# Patient Record
Sex: FEMALE | Race: WHITE | NOT HISPANIC OR LATINO | URBAN - METROPOLITAN AREA
[De-identification: names, ages, dates, MRNs, and addresses within clinical notes are randomized per-mention and may not be internally consistent; named-entity substitution may affect disease eponyms.]

---

## 2017-01-10 ENCOUNTER — EMERGENCY (EMERGENCY)
Facility: HOSPITAL | Age: 82
LOS: 1 days | Discharge: HOME | End: 2017-01-10
Attending: EMERGENCY MEDICINE | Admitting: EMERGENCY MEDICINE
Payer: MEDICARE

## 2017-01-10 VITALS
HEART RATE: 60 BPM | TEMPERATURE: 98 F | SYSTOLIC BLOOD PRESSURE: 170 MMHG | RESPIRATION RATE: 11 BRPM | OXYGEN SATURATION: 96 % | DIASTOLIC BLOOD PRESSURE: 65 MMHG | WEIGHT: 160.06 LBS

## 2017-01-10 PROCEDURE — 99285 EMERGENCY DEPT VISIT HI MDM: CPT | Mod: 25

## 2017-01-10 NOTE — ED ADULT NURSE NOTE - OBJECTIVE STATEMENT
awale no distress denies any pain at present  pt with ekg done and reviewed by MD and placed on the monitor awiting labs and  xray

## 2017-01-10 NOTE — ED ADULT NURSE NOTE - PMH
Chronic Pancreatitis    CKD (Chronic Kidney Disease)    COPD (Chronic Obstructive Pulmonary Disease)    DM (Diabetes Mellitus)    Gall Stones    Hypertension, Essential    Peptic ulcer disease

## 2017-01-11 VITALS
SYSTOLIC BLOOD PRESSURE: 170 MMHG | RESPIRATION RATE: 14 BRPM | HEART RATE: 64 BPM | TEMPERATURE: 98 F | DIASTOLIC BLOOD PRESSURE: 71 MMHG

## 2017-01-11 LAB
ALBUMIN SERPL ELPH-MCNC: 2.7 G/DL — LOW (ref 3.3–5)
ALP SERPL-CCNC: 103 U/L — SIGNIFICANT CHANGE UP (ref 40–120)
ALT FLD-CCNC: 18 U/L — SIGNIFICANT CHANGE UP (ref 12–78)
ANION GAP SERPL CALC-SCNC: 11 MMOL/L — SIGNIFICANT CHANGE UP (ref 5–17)
AST SERPL-CCNC: 20 U/L — SIGNIFICANT CHANGE UP (ref 15–37)
BASOPHILS # BLD AUTO: 0.1 K/UL — SIGNIFICANT CHANGE UP (ref 0–0.2)
BASOPHILS NFR BLD AUTO: 0.9 % — SIGNIFICANT CHANGE UP (ref 0–2)
BILIRUB SERPL-MCNC: 0.2 MG/DL — SIGNIFICANT CHANGE UP (ref 0.2–1.2)
BUN SERPL-MCNC: 59 MG/DL — HIGH (ref 7–23)
CALCIUM SERPL-MCNC: 8.5 MG/DL — SIGNIFICANT CHANGE UP (ref 8.5–10.1)
CHLORIDE SERPL-SCNC: 106 MMOL/L — SIGNIFICANT CHANGE UP (ref 96–108)
CK MB BLD-MCNC: 6.5 % — HIGH (ref 0–3.5)
CK MB CFR SERPL CALC: 3.1 NG/ML — SIGNIFICANT CHANGE UP (ref 0–3.6)
CK SERPL-CCNC: 48 U/L — SIGNIFICANT CHANGE UP (ref 26–192)
CO2 SERPL-SCNC: 23 MMOL/L — SIGNIFICANT CHANGE UP (ref 22–31)
CREAT SERPL-MCNC: 3.3 MG/DL — HIGH (ref 0.5–1.3)
EOSINOPHIL # BLD AUTO: 0.3 K/UL — SIGNIFICANT CHANGE UP (ref 0–0.5)
EOSINOPHIL NFR BLD AUTO: 3.7 % — SIGNIFICANT CHANGE UP (ref 0–6)
GLUCOSE SERPL-MCNC: 165 MG/DL — HIGH (ref 70–99)
HCT VFR BLD CALC: 32.7 % — LOW (ref 34.5–45)
HGB BLD-MCNC: 10.5 G/DL — LOW (ref 11.5–15.5)
LYMPHOCYTES # BLD AUTO: 1.5 K/UL — SIGNIFICANT CHANGE UP (ref 1–3.3)
LYMPHOCYTES # BLD AUTO: 21.9 % — SIGNIFICANT CHANGE UP (ref 13–44)
MCHC RBC-ENTMCNC: 27.9 PG — SIGNIFICANT CHANGE UP (ref 27–34)
MCHC RBC-ENTMCNC: 32.1 GM/DL — SIGNIFICANT CHANGE UP (ref 32–36)
MCV RBC AUTO: 86.8 FL — SIGNIFICANT CHANGE UP (ref 80–100)
MONOCYTES # BLD AUTO: 0.7 K/UL — SIGNIFICANT CHANGE UP (ref 0–0.9)
MONOCYTES NFR BLD AUTO: 9.5 % — HIGH (ref 1–9)
NEUTROPHILS # BLD AUTO: 4.5 K/UL — SIGNIFICANT CHANGE UP (ref 1.8–7.4)
NEUTROPHILS NFR BLD AUTO: 64.1 % — SIGNIFICANT CHANGE UP (ref 43–77)
PLATELET # BLD AUTO: 184 K/UL — SIGNIFICANT CHANGE UP (ref 150–400)
POTASSIUM SERPL-MCNC: 4.4 MMOL/L — SIGNIFICANT CHANGE UP (ref 3.5–5.3)
POTASSIUM SERPL-SCNC: 4.4 MMOL/L — SIGNIFICANT CHANGE UP (ref 3.5–5.3)
PROT SERPL-MCNC: 6.7 G/DL — SIGNIFICANT CHANGE UP (ref 6–8.3)
RBC # BLD: 3.77 M/UL — LOW (ref 3.8–5.2)
RBC # FLD: 14.6 % — HIGH (ref 10.3–14.5)
SODIUM SERPL-SCNC: 140 MMOL/L — SIGNIFICANT CHANGE UP (ref 135–145)
TROPONIN I SERPL-MCNC: 0.59 NG/ML — HIGH (ref 0.01–0.04)
TROPONIN I SERPL-MCNC: 0.86 NG/ML — HIGH (ref 0.01–0.04)
WBC # BLD: 7.1 K/UL — SIGNIFICANT CHANGE UP (ref 3.8–10.5)
WBC # FLD AUTO: 7.1 K/UL — SIGNIFICANT CHANGE UP (ref 3.8–10.5)

## 2017-01-11 PROCEDURE — 82550 ASSAY OF CK (CPK): CPT

## 2017-01-11 PROCEDURE — 71010: CPT | Mod: 26

## 2017-01-11 PROCEDURE — 85027 COMPLETE CBC AUTOMATED: CPT

## 2017-01-11 PROCEDURE — 93005 ELECTROCARDIOGRAM TRACING: CPT

## 2017-01-11 PROCEDURE — 96374 THER/PROPH/DIAG INJ IV PUSH: CPT

## 2017-01-11 PROCEDURE — 82553 CREATINE MB FRACTION: CPT

## 2017-01-11 PROCEDURE — 71045 X-RAY EXAM CHEST 1 VIEW: CPT

## 2017-01-11 PROCEDURE — 96376 TX/PRO/DX INJ SAME DRUG ADON: CPT

## 2017-01-11 PROCEDURE — 99284 EMERGENCY DEPT VISIT MOD MDM: CPT | Mod: 25

## 2017-01-11 PROCEDURE — 84484 ASSAY OF TROPONIN QUANT: CPT

## 2017-01-11 PROCEDURE — 99285 EMERGENCY DEPT VISIT HI MDM: CPT

## 2017-01-11 PROCEDURE — 93010 ELECTROCARDIOGRAM REPORT: CPT

## 2017-01-11 PROCEDURE — 80053 COMPREHEN METABOLIC PANEL: CPT

## 2017-01-11 RX ORDER — SODIUM CHLORIDE 9 MG/ML
3 INJECTION INTRAMUSCULAR; INTRAVENOUS; SUBCUTANEOUS ONCE
Qty: 0 | Refills: 0 | Status: COMPLETED | OUTPATIENT
Start: 2017-01-11 | End: 2017-01-11

## 2017-01-11 RX ORDER — METOPROLOL TARTRATE 50 MG
5 TABLET ORAL ONCE
Qty: 0 | Refills: 0 | Status: COMPLETED | OUTPATIENT
Start: 2017-01-11 | End: 2017-01-11

## 2017-01-11 RX ORDER — ASPIRIN/CALCIUM CARB/MAGNESIUM 324 MG
325 TABLET ORAL ONCE
Qty: 0 | Refills: 0 | Status: COMPLETED | OUTPATIENT
Start: 2017-01-11 | End: 2017-01-11

## 2017-01-11 RX ADMIN — Medication 5 MILLIGRAM(S): at 06:16

## 2017-01-11 RX ADMIN — Medication 5 MILLIGRAM(S): at 09:08

## 2017-01-11 RX ADMIN — SODIUM CHLORIDE 3 MILLILITER(S): 9 INJECTION INTRAMUSCULAR; INTRAVENOUS; SUBCUTANEOUS at 01:17

## 2017-01-11 RX ADMIN — Medication 325 MILLIGRAM(S): at 06:13

## 2017-01-11 NOTE — ED PROVIDER NOTE - OBJECTIVE STATEMENT
89 year old female with a history of dementia, HTN, COPD, DM, CKD presents with chest pain according to nursing home papers. It is unclear how staff at NH was able to discern that patient was having chest pain. Paper work states "chest pain" only. She is unable to provide any history.

## 2017-01-14 DIAGNOSIS — R07.89 OTHER CHEST PAIN: ICD-10-CM

## 2017-01-14 DIAGNOSIS — E11.9 TYPE 2 DIABETES MELLITUS WITHOUT COMPLICATIONS: ICD-10-CM

## 2017-01-14 DIAGNOSIS — I12.9 HYPERTENSIVE CHRONIC KIDNEY DISEASE WITH STAGE 1 THROUGH STAGE 4 CHRONIC KIDNEY DISEASE, OR UNSPECIFIED CHRONIC KIDNEY DISEASE: ICD-10-CM

## 2017-01-14 DIAGNOSIS — N18.9 CHRONIC KIDNEY DISEASE, UNSPECIFIED: ICD-10-CM

## 2017-01-14 DIAGNOSIS — J44.9 CHRONIC OBSTRUCTIVE PULMONARY DISEASE, UNSPECIFIED: ICD-10-CM

## 2017-02-10 ENCOUNTER — INPATIENT (INPATIENT)
Facility: HOSPITAL | Age: 82
LOS: 6 days | Discharge: NURSING, REHAB OR LONG-TERM CARE | DRG: 291 | End: 2017-02-17
Attending: FAMILY MEDICINE | Admitting: FAMILY MEDICINE
Payer: MEDICARE

## 2017-02-10 VITALS
DIASTOLIC BLOOD PRESSURE: 82 MMHG | RESPIRATION RATE: 20 BRPM | SYSTOLIC BLOOD PRESSURE: 145 MMHG | TEMPERATURE: 98 F | HEART RATE: 130 BPM | OXYGEN SATURATION: 99 %

## 2017-02-10 DIAGNOSIS — I50.9 HEART FAILURE, UNSPECIFIED: ICD-10-CM

## 2017-02-10 DIAGNOSIS — J44.9 CHRONIC OBSTRUCTIVE PULMONARY DISEASE, UNSPECIFIED: ICD-10-CM

## 2017-02-10 DIAGNOSIS — E11.9 TYPE 2 DIABETES MELLITUS WITHOUT COMPLICATIONS: ICD-10-CM

## 2017-02-10 DIAGNOSIS — I10 ESSENTIAL (PRIMARY) HYPERTENSION: ICD-10-CM

## 2017-02-10 DIAGNOSIS — K86.1 OTHER CHRONIC PANCREATITIS: ICD-10-CM

## 2017-02-10 DIAGNOSIS — N18.5 CHRONIC KIDNEY DISEASE, STAGE 5: ICD-10-CM

## 2017-02-10 LAB
ALBUMIN SERPL ELPH-MCNC: 3 G/DL — LOW (ref 3.3–5)
ALP SERPL-CCNC: 135 U/L — HIGH (ref 40–120)
ALT FLD-CCNC: 34 U/L — SIGNIFICANT CHANGE UP (ref 12–78)
ANION GAP SERPL CALC-SCNC: 10 MMOL/L — SIGNIFICANT CHANGE UP (ref 5–17)
AST SERPL-CCNC: 24 U/L — SIGNIFICANT CHANGE UP (ref 15–37)
BILIRUB SERPL-MCNC: 0.2 MG/DL — SIGNIFICANT CHANGE UP (ref 0.2–1.2)
BUN SERPL-MCNC: 60 MG/DL — HIGH (ref 7–23)
CALCIUM SERPL-MCNC: 8.7 MG/DL — SIGNIFICANT CHANGE UP (ref 8.5–10.1)
CHLORIDE SERPL-SCNC: 106 MMOL/L — SIGNIFICANT CHANGE UP (ref 96–108)
CK MB BLD-MCNC: 10.2 % — HIGH (ref 0–3.5)
CK MB CFR SERPL CALC: 4.4 NG/ML — HIGH (ref 0–3.6)
CK MB CFR SERPL CALC: 5.4 NG/ML — HIGH (ref 0–3.6)
CK SERPL-CCNC: 50 U/L — SIGNIFICANT CHANGE UP (ref 26–192)
CK SERPL-CCNC: 53 U/L — SIGNIFICANT CHANGE UP (ref 26–192)
CO2 SERPL-SCNC: 25 MMOL/L — SIGNIFICANT CHANGE UP (ref 22–31)
CREAT SERPL-MCNC: 3.3 MG/DL — HIGH (ref 0.5–1.3)
GLUCOSE SERPL-MCNC: 187 MG/DL — HIGH (ref 70–99)
HCT VFR BLD CALC: 34.6 % — SIGNIFICANT CHANGE UP (ref 34.5–45)
HGB BLD-MCNC: 10.7 G/DL — LOW (ref 11.5–15.5)
INR BLD: 0.97 RATIO — SIGNIFICANT CHANGE UP (ref 0.88–1.16)
LACTATE SERPL-SCNC: 1.2 MMOL/L — SIGNIFICANT CHANGE UP (ref 0.7–2)
MCHC RBC-ENTMCNC: 28.1 PG — SIGNIFICANT CHANGE UP (ref 27–34)
MCHC RBC-ENTMCNC: 30.8 GM/DL — LOW (ref 32–36)
MCV RBC AUTO: 91 FL — SIGNIFICANT CHANGE UP (ref 80–100)
NT-PROBNP SERPL-SCNC: HIGH PG/ML (ref 0–450)
PLATELET # BLD AUTO: 138 K/UL — LOW (ref 150–400)
POTASSIUM SERPL-MCNC: 4.7 MMOL/L — SIGNIFICANT CHANGE UP (ref 3.5–5.3)
POTASSIUM SERPL-SCNC: 4.7 MMOL/L — SIGNIFICANT CHANGE UP (ref 3.5–5.3)
PROT SERPL-MCNC: 7.3 G/DL — SIGNIFICANT CHANGE UP (ref 6–8.3)
PROTHROM AB SERPL-ACNC: 10.8 SEC — SIGNIFICANT CHANGE UP (ref 10–13.1)
RAPID RVP RESULT: SIGNIFICANT CHANGE UP
RBC # BLD: 3.81 M/UL — SIGNIFICANT CHANGE UP (ref 3.8–5.2)
RBC # FLD: 15.2 % — HIGH (ref 10.3–14.5)
SODIUM SERPL-SCNC: 141 MMOL/L — SIGNIFICANT CHANGE UP (ref 135–145)
TROPONIN I SERPL-MCNC: 0.28 NG/ML — HIGH (ref 0.01–0.04)
TROPONIN I SERPL-MCNC: 0.38 NG/ML — HIGH (ref 0.01–0.04)
TROPONIN I SERPL-MCNC: 0.54 NG/ML — HIGH (ref 0.01–0.04)
TROPONIN I SERPL-MCNC: 0.56 NG/ML — HIGH (ref 0.01–0.04)
WBC # BLD: 6.2 K/UL — SIGNIFICANT CHANGE UP (ref 3.8–10.5)
WBC # FLD AUTO: 6.2 K/UL — SIGNIFICANT CHANGE UP (ref 3.8–10.5)

## 2017-02-10 PROCEDURE — 71010: CPT | Mod: 26

## 2017-02-10 PROCEDURE — 99223 1ST HOSP IP/OBS HIGH 75: CPT | Mod: AI

## 2017-02-10 PROCEDURE — 93010 ELECTROCARDIOGRAM REPORT: CPT

## 2017-02-10 PROCEDURE — 99285 EMERGENCY DEPT VISIT HI MDM: CPT | Mod: 25

## 2017-02-10 PROCEDURE — 99223 1ST HOSP IP/OBS HIGH 75: CPT

## 2017-02-10 RX ORDER — PANTOPRAZOLE SODIUM 20 MG/1
40 TABLET, DELAYED RELEASE ORAL
Qty: 0 | Refills: 0 | Status: DISCONTINUED | OUTPATIENT
Start: 2017-02-10 | End: 2017-02-17

## 2017-02-10 RX ORDER — LIPASE/PROTEASE/AMYLASE 16-48-48K
5 CAPSULE,DELAYED RELEASE (ENTERIC COATED) ORAL
Qty: 0 | Refills: 0 | Status: DISCONTINUED | OUTPATIENT
Start: 2017-02-10 | End: 2017-02-11

## 2017-02-10 RX ORDER — TRAZODONE HCL 50 MG
1 TABLET ORAL
Qty: 0 | Refills: 0 | COMMUNITY

## 2017-02-10 RX ORDER — ASPIRIN/CALCIUM CARB/MAGNESIUM 324 MG
325 TABLET ORAL DAILY
Qty: 0 | Refills: 0 | Status: DISCONTINUED | OUTPATIENT
Start: 2017-02-10 | End: 2017-02-13

## 2017-02-10 RX ORDER — METOPROLOL TARTRATE 50 MG
25 TABLET ORAL DAILY
Qty: 0 | Refills: 0 | Status: DISCONTINUED | OUTPATIENT
Start: 2017-02-10 | End: 2017-02-13

## 2017-02-10 RX ORDER — INSULIN LISPRO 100/ML
VIAL (ML) SUBCUTANEOUS
Qty: 0 | Refills: 0 | Status: DISCONTINUED | OUTPATIENT
Start: 2017-02-10 | End: 2017-02-17

## 2017-02-10 RX ORDER — FERROUS SULFATE 325(65) MG
325 TABLET ORAL
Qty: 0 | Refills: 0 | Status: DISCONTINUED | OUTPATIENT
Start: 2017-02-10 | End: 2017-02-17

## 2017-02-10 RX ORDER — FUROSEMIDE 40 MG
40 TABLET ORAL DAILY
Qty: 0 | Refills: 0 | Status: DISCONTINUED | OUTPATIENT
Start: 2017-02-10 | End: 2017-02-13

## 2017-02-10 RX ORDER — DEXTROSE 50 % IN WATER 50 %
25 SYRINGE (ML) INTRAVENOUS ONCE
Qty: 0 | Refills: 0 | Status: DISCONTINUED | OUTPATIENT
Start: 2017-02-10 | End: 2017-02-17

## 2017-02-10 RX ORDER — LIPASE/PROTEASE/AMYLASE 16-48-48K
1 CAPSULE,DELAYED RELEASE (ENTERIC COATED) ORAL
Qty: 0 | Refills: 0 | Status: DISCONTINUED | OUTPATIENT
Start: 2017-02-10 | End: 2017-02-11

## 2017-02-10 RX ORDER — DEXTROSE 50 % IN WATER 50 %
1 SYRINGE (ML) INTRAVENOUS ONCE
Qty: 0 | Refills: 0 | Status: DISCONTINUED | OUTPATIENT
Start: 2017-02-10 | End: 2017-02-17

## 2017-02-10 RX ORDER — FERROUS SULFATE 325(65) MG
1 TABLET ORAL
Qty: 0 | Refills: 0 | COMMUNITY

## 2017-02-10 RX ORDER — AZITHROMYCIN 500 MG/1
500 TABLET, FILM COATED ORAL ONCE
Qty: 0 | Refills: 0 | Status: COMPLETED | OUTPATIENT
Start: 2017-02-10 | End: 2017-02-10

## 2017-02-10 RX ORDER — SIMVASTATIN 20 MG/1
20 TABLET, FILM COATED ORAL AT BEDTIME
Qty: 0 | Refills: 0 | Status: DISCONTINUED | OUTPATIENT
Start: 2017-02-10 | End: 2017-02-17

## 2017-02-10 RX ORDER — FUROSEMIDE 40 MG
20 TABLET ORAL ONCE
Qty: 0 | Refills: 0 | Status: COMPLETED | OUTPATIENT
Start: 2017-02-10 | End: 2017-02-10

## 2017-02-10 RX ORDER — CEFTRIAXONE 500 MG/1
1 INJECTION, POWDER, FOR SOLUTION INTRAMUSCULAR; INTRAVENOUS ONCE
Qty: 0 | Refills: 0 | Status: COMPLETED | OUTPATIENT
Start: 2017-02-10 | End: 2017-02-10

## 2017-02-10 RX ORDER — SODIUM CHLORIDE 9 MG/ML
1000 INJECTION, SOLUTION INTRAVENOUS
Qty: 0 | Refills: 0 | Status: DISCONTINUED | OUTPATIENT
Start: 2017-02-10 | End: 2017-02-17

## 2017-02-10 RX ORDER — IPRATROPIUM/ALBUTEROL SULFATE 18-103MCG
3 AEROSOL WITH ADAPTER (GRAM) INHALATION ONCE
Qty: 0 | Refills: 0 | Status: COMPLETED | OUTPATIENT
Start: 2017-02-10 | End: 2017-02-10

## 2017-02-10 RX ORDER — HEPARIN SODIUM 5000 [USP'U]/ML
5000 INJECTION INTRAVENOUS; SUBCUTANEOUS EVERY 12 HOURS
Qty: 0 | Refills: 0 | Status: DISCONTINUED | OUTPATIENT
Start: 2017-02-10 | End: 2017-02-17

## 2017-02-10 RX ORDER — CALCITRIOL 0.5 UG/1
0.25 CAPSULE ORAL DAILY
Qty: 0 | Refills: 0 | Status: DISCONTINUED | OUTPATIENT
Start: 2017-02-10 | End: 2017-02-17

## 2017-02-10 RX ORDER — SUCRALFATE 1 G
1 TABLET ORAL THREE TIMES A DAY
Qty: 0 | Refills: 0 | Status: DISCONTINUED | OUTPATIENT
Start: 2017-02-10 | End: 2017-02-17

## 2017-02-10 RX ORDER — ASPIRIN/CALCIUM CARB/MAGNESIUM 324 MG
325 TABLET ORAL ONCE
Qty: 0 | Refills: 0 | Status: COMPLETED | OUTPATIENT
Start: 2017-02-10 | End: 2017-02-10

## 2017-02-10 RX ORDER — TRAZODONE HCL 50 MG
50 TABLET ORAL AT BEDTIME
Qty: 0 | Refills: 0 | Status: DISCONTINUED | OUTPATIENT
Start: 2017-02-10 | End: 2017-02-17

## 2017-02-10 RX ORDER — GLUCAGON INJECTION, SOLUTION 0.5 MG/.1ML
1 INJECTION, SOLUTION SUBCUTANEOUS ONCE
Qty: 0 | Refills: 0 | Status: DISCONTINUED | OUTPATIENT
Start: 2017-02-10 | End: 2017-02-17

## 2017-02-10 RX ORDER — METOPROLOL TARTRATE 50 MG
0 TABLET ORAL
Qty: 0 | Refills: 0 | COMMUNITY

## 2017-02-10 RX ORDER — DEXTROSE 50 % IN WATER 50 %
12.5 SYRINGE (ML) INTRAVENOUS ONCE
Qty: 0 | Refills: 0 | Status: DISCONTINUED | OUTPATIENT
Start: 2017-02-10 | End: 2017-02-17

## 2017-02-10 RX ORDER — LIPASE/PROTEASE/AMYLASE 16-48-48K
3 CAPSULE,DELAYED RELEASE (ENTERIC COATED) ORAL
Qty: 0 | Refills: 0 | Status: DISCONTINUED | OUTPATIENT
Start: 2017-02-10 | End: 2017-02-11

## 2017-02-10 RX ADMIN — Medication 325 MILLIGRAM(S): at 06:57

## 2017-02-10 RX ADMIN — Medication 20 MILLIGRAM(S): at 08:57

## 2017-02-10 RX ADMIN — Medication 325 MILLIGRAM(S): at 21:50

## 2017-02-10 RX ADMIN — Medication 25 MILLIGRAM(S): at 13:27

## 2017-02-10 RX ADMIN — HEPARIN SODIUM 5000 UNIT(S): 5000 INJECTION INTRAVENOUS; SUBCUTANEOUS at 18:58

## 2017-02-10 RX ADMIN — Medication 40 MILLIGRAM(S): at 21:50

## 2017-02-10 RX ADMIN — CEFTRIAXONE 100 GRAM(S): 500 INJECTION, POWDER, FOR SOLUTION INTRAMUSCULAR; INTRAVENOUS at 08:57

## 2017-02-10 RX ADMIN — AZITHROMYCIN 255 MILLIGRAM(S): 500 TABLET, FILM COATED ORAL at 08:56

## 2017-02-10 NOTE — PATIENT PROFILE ADULT. - NS PRO AD PATIENT TYPE ON CHART
Health Care Proxy (HCP)/Do Not Resuscitate (DNR)/Medical Orders for Life-Sustaining Treatment (MOLST)

## 2017-02-10 NOTE — ED PROVIDER NOTE - CONSTITUTIONAL, MLM
normal... Well appearing, well nourished, awake, alert, oriented to person, place, time/situation and in mild to moderate  distress.

## 2017-02-10 NOTE — ED ADULT NURSE REASSESSMENT NOTE - NEURO WDL
Alert and oriented to person, place and time, memory intact, behavior appropriate to situation, PERRL. [place]

## 2017-02-10 NOTE — H&P ADULT. - ATTENDING COMMENTS
89 year old old female with a PMH of COPD, CRF, HTN, Chronic Pancreatitis, Gallstones, PVD, recent ER visit for chest pain in January 2017, presents to the ER, BIBEMS from nursing home, for chest heaviness a/w prob CHF exacerbation,? need for dialysis and r/o acs and flu. Plan: apprec renal and cardio recs, monitor i/os, f/u cultures, monitor clinical course.

## 2017-02-10 NOTE — H&P ADULT. - HISTORY OF PRESENT ILLNESS
89 year old old female with a PMH of COPD, CRF, HTN, Chronic Pancreatitis, Gallstones, PVD, recent ER visit for chest pain in January 2017, presents to the ER, BIBEMS from nursing home, for chest heaviness that woke her up in the middle of the night.  She stays that EMS administered aspirin for her chest pain with good relief.  Patient currently in bed lying supine without complaints of chest pain/pressure/ heaviness or dyspnea.  Patient able to speak in full sentences before having to catch her breath.  Difficult to obtain history due to extremely hard of hearing.

## 2017-02-10 NOTE — ED PROVIDER NOTE - ENMT, MLM
Airway patent, Nasal mucosa clear. Mouth with normal mucosa. Throat has no vesicles, no oropharyngeal exudates and uvula is midline. Neck vein distention

## 2017-02-10 NOTE — H&P ADULT. - RS GEN PE MLT RESP DETAILS PC
breath sounds equal/no rales/good air movement/no intercostal retractions/no subcutaneous emphysema/airway patent/respirations non-labored/no rhonchi/wheezes

## 2017-02-10 NOTE — H&P ADULT. - PROBLEM SELECTOR PLAN 1
Acute on chronic  Monitor on Telemetry  -Diuretics Given  -Consult Cardiology  -Supplemental Oxygen if hypoxic  -Breathing treatments as needed

## 2017-02-10 NOTE — ED PROVIDER NOTE - OBJECTIVE STATEMENT
89 y/o W/F h/o COPD, CRF DM HTN, Pancreatitis.  The patient woke with SOB and chest pain. He symptoms resolved with ASA 162mg and oxygen

## 2017-02-10 NOTE — ED ADULT NURSE NOTE - OBJECTIVE STATEMENT
received pt c/o chest painawake with no dostress pt evaluated and ekg done and chest xray donr placed on cardiac monitor

## 2017-02-10 NOTE — H&P ADULT. - ASSESSMENT
89 year old old female with a PMH of COPD, CRF, HTN, Chronic Pancreatitis, Gallstones, PVD, recent ER visit for chest pain in January 2017, presents to the ER, BIBEMS from nursing home, for chest heaviness that woke her up in the middle of the night.  Patient resting comfortably in bed, without signs/symptoms of distress after receiving diuretics and abx in the ER. 89 year old old female with a PMH of COPD, CRF, HTN, Chronic Pancreatitis, Gallstones, PVD, recent ER visit for chest pain in January 2017, presents to the ER, BIBEMS from nursing home, for chest heaviness a/w prob CHF exacerbation,? need for dialysis and r/o acs and flu.

## 2017-02-10 NOTE — INPATIENT CERTIFICATION FOR MEDICARE PATIENTS - RISKS OF ADVERSE EVENTS
Concern for worsening infectious process/Concern for delay in diagnosis and treatment/Concern for cardiopulmonary deterioration/Other:

## 2017-02-10 NOTE — H&P ADULT. - PROBLEM SELECTOR PLAN 4
Chronic  Trend BMP throughout hospital course  -Consult Renal for azotemia Chronic  Trend BMP throughout hospital course  -Consult Renal for azotemia and poss need for dialysis

## 2017-02-10 NOTE — ED PROVIDER NOTE - SIGNIFICANT NEGATIVE FINDINGS
no headache,  no Syncope , no palpitations, no abdominal pain,  no n/v/d, no urinary symptoms, no bleeding. no neuro changes.

## 2017-02-11 LAB
ANION GAP SERPL CALC-SCNC: 13 MMOL/L — SIGNIFICANT CHANGE UP (ref 5–17)
BASOPHILS # BLD AUTO: 0.1 K/UL — SIGNIFICANT CHANGE UP (ref 0–0.2)
BASOPHILS NFR BLD AUTO: 0.9 % — SIGNIFICANT CHANGE UP (ref 0–2)
BUN SERPL-MCNC: 62 MG/DL — HIGH (ref 7–23)
CALCIUM SERPL-MCNC: 8.8 MG/DL — SIGNIFICANT CHANGE UP (ref 8.5–10.1)
CHLORIDE SERPL-SCNC: 102 MMOL/L — SIGNIFICANT CHANGE UP (ref 96–108)
CO2 SERPL-SCNC: 25 MMOL/L — SIGNIFICANT CHANGE UP (ref 22–31)
CREAT SERPL-MCNC: 3.4 MG/DL — HIGH (ref 0.5–1.3)
EOSINOPHIL # BLD AUTO: 0.3 K/UL — SIGNIFICANT CHANGE UP (ref 0–0.5)
EOSINOPHIL NFR BLD AUTO: 4.1 % — SIGNIFICANT CHANGE UP (ref 0–6)
GLUCOSE SERPL-MCNC: 153 MG/DL — HIGH (ref 70–99)
HBA1C BLD-MCNC: 8.1 % — HIGH (ref 4–5.6)
HCT VFR BLD CALC: 36 % — SIGNIFICANT CHANGE UP (ref 34.5–45)
HGB BLD-MCNC: 11.3 G/DL — LOW (ref 11.5–15.5)
LYMPHOCYTES # BLD AUTO: 1.8 K/UL — SIGNIFICANT CHANGE UP (ref 1–3.3)
LYMPHOCYTES # BLD AUTO: 24.5 % — SIGNIFICANT CHANGE UP (ref 13–44)
MCHC RBC-ENTMCNC: 28.3 PG — SIGNIFICANT CHANGE UP (ref 27–34)
MCHC RBC-ENTMCNC: 31.3 GM/DL — LOW (ref 32–36)
MCV RBC AUTO: 90.4 FL — SIGNIFICANT CHANGE UP (ref 80–100)
MONOCYTES # BLD AUTO: 0.7 K/UL — SIGNIFICANT CHANGE UP (ref 0–0.9)
MONOCYTES NFR BLD AUTO: 9.6 % — HIGH (ref 1–9)
NEUTROPHILS # BLD AUTO: 4.4 K/UL — SIGNIFICANT CHANGE UP (ref 1.8–7.4)
NEUTROPHILS NFR BLD AUTO: 60.8 % — SIGNIFICANT CHANGE UP (ref 43–77)
NT-PROBNP SERPL-SCNC: HIGH PG/ML (ref 0–450)
PLATELET # BLD AUTO: 173 K/UL — SIGNIFICANT CHANGE UP (ref 150–400)
POTASSIUM SERPL-MCNC: 4.5 MMOL/L — SIGNIFICANT CHANGE UP (ref 3.5–5.3)
POTASSIUM SERPL-SCNC: 4.5 MMOL/L — SIGNIFICANT CHANGE UP (ref 3.5–5.3)
RBC # BLD: 3.98 M/UL — SIGNIFICANT CHANGE UP (ref 3.8–5.2)
RBC # FLD: 15.1 % — HIGH (ref 10.3–14.5)
SODIUM SERPL-SCNC: 140 MMOL/L — SIGNIFICANT CHANGE UP (ref 135–145)
TROPONIN I SERPL-MCNC: 0.49 NG/ML — HIGH (ref 0.01–0.04)
TROPONIN I SERPL-MCNC: 0.55 NG/ML — HIGH (ref 0.01–0.04)
WBC # BLD: 7.3 K/UL — SIGNIFICANT CHANGE UP (ref 3.8–10.5)
WBC # FLD AUTO: 7.3 K/UL — SIGNIFICANT CHANGE UP (ref 3.8–10.5)

## 2017-02-11 PROCEDURE — 99233 SBSQ HOSP IP/OBS HIGH 50: CPT

## 2017-02-11 PROCEDURE — 93306 TTE W/DOPPLER COMPLETE: CPT | Mod: 26

## 2017-02-11 PROCEDURE — 93010 ELECTROCARDIOGRAM REPORT: CPT

## 2017-02-11 RX ORDER — LIPASE/PROTEASE/AMYLASE 16-48-48K
1 CAPSULE,DELAYED RELEASE (ENTERIC COATED) ORAL
Qty: 0 | Refills: 0 | Status: DISCONTINUED | OUTPATIENT
Start: 2017-02-11 | End: 2017-02-17

## 2017-02-11 RX ORDER — IPRATROPIUM/ALBUTEROL SULFATE 18-103MCG
3 AEROSOL WITH ADAPTER (GRAM) INHALATION EVERY 6 HOURS
Qty: 0 | Refills: 0 | Status: DISCONTINUED | OUTPATIENT
Start: 2017-02-11 | End: 2017-02-17

## 2017-02-11 RX ADMIN — Medication 325 MILLIGRAM(S): at 11:28

## 2017-02-11 RX ADMIN — Medication 1 CAPSULE(S): at 13:00

## 2017-02-11 RX ADMIN — Medication 50 MILLIGRAM(S): at 21:15

## 2017-02-11 RX ADMIN — Medication 325 MILLIGRAM(S): at 13:00

## 2017-02-11 RX ADMIN — HEPARIN SODIUM 5000 UNIT(S): 5000 INJECTION INTRAVENOUS; SUBCUTANEOUS at 06:09

## 2017-02-11 RX ADMIN — Medication 4: at 16:57

## 2017-02-11 RX ADMIN — Medication 1 CAPSULE(S): at 16:58

## 2017-02-11 RX ADMIN — SIMVASTATIN 20 MILLIGRAM(S): 20 TABLET, FILM COATED ORAL at 21:15

## 2017-02-11 RX ADMIN — Medication 1 GRAM(S): at 13:00

## 2017-02-11 RX ADMIN — Medication 1 GRAM(S): at 21:15

## 2017-02-11 RX ADMIN — Medication 1 GRAM(S): at 06:09

## 2017-02-11 RX ADMIN — CALCITRIOL 0.25 MICROGRAM(S): 0.5 CAPSULE ORAL at 11:28

## 2017-02-11 RX ADMIN — Medication 0: at 08:24

## 2017-02-11 RX ADMIN — Medication 40 MILLIGRAM(S): at 06:09

## 2017-02-11 RX ADMIN — Medication 1 CAPSULE(S): at 08:24

## 2017-02-11 RX ADMIN — Medication 25 MILLIGRAM(S): at 06:09

## 2017-02-11 RX ADMIN — Medication 1: at 11:29

## 2017-02-11 RX ADMIN — Medication 3 MILLILITER(S): at 20:01

## 2017-02-11 RX ADMIN — HEPARIN SODIUM 5000 UNIT(S): 5000 INJECTION INTRAVENOUS; SUBCUTANEOUS at 17:04

## 2017-02-11 RX ADMIN — Medication 325 MILLIGRAM(S): at 08:24

## 2017-02-11 RX ADMIN — PANTOPRAZOLE SODIUM 40 MILLIGRAM(S): 20 TABLET, DELAYED RELEASE ORAL at 06:09

## 2017-02-11 RX ADMIN — Medication 3 MILLILITER(S): at 16:04

## 2017-02-11 RX ADMIN — Medication 325 MILLIGRAM(S): at 16:58

## 2017-02-11 NOTE — DIETITIAN INITIAL EVALUATION ADULT. - PROBLEM SELECTOR PLAN 4
Chronic  Trend BMP throughout hospital course  -Consult Renal for azotemia and poss need for dialysis

## 2017-02-11 NOTE — DIETITIAN INITIAL EVALUATION ADULT. - OTHER INFO
pt denies problems chewing or swallowing. Did not eat much bfst.- 2 bites egg, fruit cup. c/o nausea

## 2017-02-12 LAB
ANION GAP SERPL CALC-SCNC: 11 MMOL/L — SIGNIFICANT CHANGE UP (ref 5–17)
BUN SERPL-MCNC: 73 MG/DL — HIGH (ref 7–23)
CALCIUM SERPL-MCNC: 8.5 MG/DL — SIGNIFICANT CHANGE UP (ref 8.5–10.1)
CHLORIDE SERPL-SCNC: 102 MMOL/L — SIGNIFICANT CHANGE UP (ref 96–108)
CO2 SERPL-SCNC: 28 MMOL/L — SIGNIFICANT CHANGE UP (ref 22–31)
CREAT SERPL-MCNC: 3.8 MG/DL — HIGH (ref 0.5–1.3)
GLUCOSE SERPL-MCNC: 150 MG/DL — HIGH (ref 70–99)
HCT VFR BLD CALC: 34.5 % — SIGNIFICANT CHANGE UP (ref 34.5–45)
HGB BLD-MCNC: 10.7 G/DL — LOW (ref 11.5–15.5)
MCHC RBC-ENTMCNC: 28.7 PG — SIGNIFICANT CHANGE UP (ref 27–34)
MCHC RBC-ENTMCNC: 31.1 GM/DL — LOW (ref 32–36)
MCV RBC AUTO: 92.1 FL — SIGNIFICANT CHANGE UP (ref 80–100)
PLATELET # BLD AUTO: 173 K/UL — SIGNIFICANT CHANGE UP (ref 150–400)
POTASSIUM SERPL-MCNC: 4.1 MMOL/L — SIGNIFICANT CHANGE UP (ref 3.5–5.3)
POTASSIUM SERPL-SCNC: 4.1 MMOL/L — SIGNIFICANT CHANGE UP (ref 3.5–5.3)
RBC # BLD: 3.74 M/UL — LOW (ref 3.8–5.2)
RBC # FLD: 14.8 % — HIGH (ref 10.3–14.5)
SODIUM SERPL-SCNC: 141 MMOL/L — SIGNIFICANT CHANGE UP (ref 135–145)
WBC # BLD: 7.6 K/UL — SIGNIFICANT CHANGE UP (ref 3.8–10.5)
WBC # FLD AUTO: 7.6 K/UL — SIGNIFICANT CHANGE UP (ref 3.8–10.5)

## 2017-02-12 PROCEDURE — 99233 SBSQ HOSP IP/OBS HIGH 50: CPT

## 2017-02-12 RX ORDER — LIDOCAINE 4 G/100G
1 CREAM TOPICAL DAILY
Qty: 0 | Refills: 0 | Status: DISCONTINUED | OUTPATIENT
Start: 2017-02-12 | End: 2017-02-17

## 2017-02-12 RX ORDER — NITROGLYCERIN 6.5 MG
1 CAPSULE, EXTENDED RELEASE ORAL DAILY
Qty: 0 | Refills: 0 | Status: DISCONTINUED | OUTPATIENT
Start: 2017-02-12 | End: 2017-02-17

## 2017-02-12 RX ORDER — ACETAMINOPHEN 500 MG
650 TABLET ORAL EVERY 6 HOURS
Qty: 0 | Refills: 0 | Status: DISCONTINUED | OUTPATIENT
Start: 2017-02-12 | End: 2017-02-17

## 2017-02-12 RX ADMIN — Medication 1 GRAM(S): at 05:43

## 2017-02-12 RX ADMIN — CALCITRIOL 0.25 MICROGRAM(S): 0.5 CAPSULE ORAL at 12:27

## 2017-02-12 RX ADMIN — Medication 1 PATCH: at 17:35

## 2017-02-12 RX ADMIN — Medication 1 GRAM(S): at 21:53

## 2017-02-12 RX ADMIN — Medication 325 MILLIGRAM(S): at 12:27

## 2017-02-12 RX ADMIN — Medication 650 MILLIGRAM(S): at 13:08

## 2017-02-12 RX ADMIN — Medication 650 MILLIGRAM(S): at 12:27

## 2017-02-12 RX ADMIN — Medication 3 MILLILITER(S): at 19:48

## 2017-02-12 RX ADMIN — Medication 1 CAPSULE(S): at 08:55

## 2017-02-12 RX ADMIN — PANTOPRAZOLE SODIUM 40 MILLIGRAM(S): 20 TABLET, DELAYED RELEASE ORAL at 05:43

## 2017-02-12 RX ADMIN — Medication 1 CAPSULE(S): at 12:27

## 2017-02-12 RX ADMIN — HEPARIN SODIUM 5000 UNIT(S): 5000 INJECTION INTRAVENOUS; SUBCUTANEOUS at 05:43

## 2017-02-12 RX ADMIN — Medication 1: at 08:55

## 2017-02-12 RX ADMIN — SIMVASTATIN 20 MILLIGRAM(S): 20 TABLET, FILM COATED ORAL at 21:53

## 2017-02-12 RX ADMIN — Medication 3 MILLILITER(S): at 07:31

## 2017-02-12 RX ADMIN — Medication 4: at 17:36

## 2017-02-12 RX ADMIN — HEPARIN SODIUM 5000 UNIT(S): 5000 INJECTION INTRAVENOUS; SUBCUTANEOUS at 17:36

## 2017-02-12 RX ADMIN — Medication 50 MILLIGRAM(S): at 21:53

## 2017-02-12 RX ADMIN — Medication 325 MILLIGRAM(S): at 17:36

## 2017-02-12 RX ADMIN — Medication 1 GRAM(S): at 13:07

## 2017-02-12 RX ADMIN — Medication 1 CAPSULE(S): at 17:36

## 2017-02-12 RX ADMIN — Medication 3 MILLILITER(S): at 13:13

## 2017-02-12 RX ADMIN — Medication 25 MILLIGRAM(S): at 05:43

## 2017-02-12 RX ADMIN — LIDOCAINE 1 PATCH: 4 CREAM TOPICAL at 13:07

## 2017-02-12 RX ADMIN — Medication 40 MILLIGRAM(S): at 05:43

## 2017-02-12 RX ADMIN — Medication 3: at 12:28

## 2017-02-12 RX ADMIN — Medication 325 MILLIGRAM(S): at 08:55

## 2017-02-13 LAB
ANION GAP SERPL CALC-SCNC: 17 MMOL/L — SIGNIFICANT CHANGE UP (ref 5–17)
BUN SERPL-MCNC: 83 MG/DL — HIGH (ref 7–23)
CALCIUM SERPL-MCNC: 8.6 MG/DL — SIGNIFICANT CHANGE UP (ref 8.5–10.1)
CHLORIDE SERPL-SCNC: 97 MMOL/L — SIGNIFICANT CHANGE UP (ref 96–108)
CO2 SERPL-SCNC: 23 MMOL/L — SIGNIFICANT CHANGE UP (ref 22–31)
CREAT SERPL-MCNC: 4 MG/DL — HIGH (ref 0.5–1.3)
GLUCOSE SERPL-MCNC: 213 MG/DL — HIGH (ref 70–99)
POTASSIUM SERPL-MCNC: 3.9 MMOL/L — SIGNIFICANT CHANGE UP (ref 3.5–5.3)
POTASSIUM SERPL-SCNC: 3.9 MMOL/L — SIGNIFICANT CHANGE UP (ref 3.5–5.3)
SODIUM SERPL-SCNC: 137 MMOL/L — SIGNIFICANT CHANGE UP (ref 135–145)

## 2017-02-13 PROCEDURE — 71010: CPT | Mod: 26

## 2017-02-13 PROCEDURE — 76775 US EXAM ABDO BACK WALL LIM: CPT | Mod: 26

## 2017-02-13 PROCEDURE — 99233 SBSQ HOSP IP/OBS HIGH 50: CPT | Mod: GC

## 2017-02-13 PROCEDURE — 76770 US EXAM ABDO BACK WALL COMP: CPT | Mod: 26

## 2017-02-13 PROCEDURE — 99233 SBSQ HOSP IP/OBS HIGH 50: CPT

## 2017-02-13 RX ORDER — ASPIRIN/CALCIUM CARB/MAGNESIUM 324 MG
81 TABLET ORAL DAILY
Qty: 0 | Refills: 0 | Status: DISCONTINUED | OUTPATIENT
Start: 2017-02-13 | End: 2017-02-17

## 2017-02-13 RX ORDER — METOPROLOL TARTRATE 50 MG
25 TABLET ORAL
Qty: 0 | Refills: 0 | Status: DISCONTINUED | OUTPATIENT
Start: 2017-02-13 | End: 2017-02-16

## 2017-02-13 RX ADMIN — Medication 325 MILLIGRAM(S): at 12:43

## 2017-02-13 RX ADMIN — Medication 1: at 08:35

## 2017-02-13 RX ADMIN — LIDOCAINE 1 PATCH: 4 CREAM TOPICAL at 12:42

## 2017-02-13 RX ADMIN — PANTOPRAZOLE SODIUM 40 MILLIGRAM(S): 20 TABLET, DELAYED RELEASE ORAL at 08:33

## 2017-02-13 RX ADMIN — HEPARIN SODIUM 5000 UNIT(S): 5000 INJECTION INTRAVENOUS; SUBCUTANEOUS at 18:29

## 2017-02-13 RX ADMIN — Medication 325 MILLIGRAM(S): at 18:30

## 2017-02-13 RX ADMIN — CALCITRIOL 0.25 MICROGRAM(S): 0.5 CAPSULE ORAL at 12:41

## 2017-02-13 RX ADMIN — Medication 3 MILLILITER(S): at 21:04

## 2017-02-13 RX ADMIN — Medication 325 MILLIGRAM(S): at 12:41

## 2017-02-13 RX ADMIN — Medication 1 GRAM(S): at 05:08

## 2017-02-13 RX ADMIN — Medication 325 MILLIGRAM(S): at 08:33

## 2017-02-13 RX ADMIN — Medication 3 MILLILITER(S): at 07:36

## 2017-02-13 RX ADMIN — SIMVASTATIN 20 MILLIGRAM(S): 20 TABLET, FILM COATED ORAL at 22:35

## 2017-02-13 RX ADMIN — Medication 1 GRAM(S): at 16:17

## 2017-02-13 RX ADMIN — HEPARIN SODIUM 5000 UNIT(S): 5000 INJECTION INTRAVENOUS; SUBCUTANEOUS at 05:08

## 2017-02-13 RX ADMIN — Medication 1 GRAM(S): at 22:34

## 2017-02-13 RX ADMIN — Medication 1 PATCH: at 12:43

## 2017-02-13 RX ADMIN — Medication 3 MILLILITER(S): at 13:14

## 2017-02-13 RX ADMIN — Medication 50 MILLIGRAM(S): at 22:35

## 2017-02-13 RX ADMIN — Medication 40 MILLIGRAM(S): at 05:09

## 2017-02-13 RX ADMIN — Medication 1 CAPSULE(S): at 12:41

## 2017-02-13 RX ADMIN — Medication 1 CAPSULE(S): at 08:40

## 2017-02-13 RX ADMIN — Medication 3: at 18:29

## 2017-02-13 RX ADMIN — Medication 4: at 12:57

## 2017-02-13 RX ADMIN — Medication 1 CAPSULE(S): at 18:30

## 2017-02-14 LAB
ANION GAP SERPL CALC-SCNC: 14 MMOL/L — SIGNIFICANT CHANGE UP (ref 5–17)
BUN SERPL-MCNC: 96 MG/DL — HIGH (ref 7–23)
CALCIUM SERPL-MCNC: 8.7 MG/DL — SIGNIFICANT CHANGE UP (ref 8.5–10.1)
CHLORIDE SERPL-SCNC: 99 MMOL/L — SIGNIFICANT CHANGE UP (ref 96–108)
CO2 SERPL-SCNC: 24 MMOL/L — SIGNIFICANT CHANGE UP (ref 22–31)
CREAT SERPL-MCNC: 4.6 MG/DL — HIGH (ref 0.5–1.3)
GLUCOSE SERPL-MCNC: 198 MG/DL — HIGH (ref 70–99)
HCT VFR BLD CALC: 30.7 % — LOW (ref 34.5–45)
HGB BLD-MCNC: 9.6 G/DL — LOW (ref 11.5–15.5)
MCHC RBC-ENTMCNC: 28.8 PG — SIGNIFICANT CHANGE UP (ref 27–34)
MCHC RBC-ENTMCNC: 31.4 GM/DL — LOW (ref 32–36)
MCV RBC AUTO: 91.7 FL — SIGNIFICANT CHANGE UP (ref 80–100)
PLATELET # BLD AUTO: 166 K/UL — SIGNIFICANT CHANGE UP (ref 150–400)
POTASSIUM SERPL-MCNC: 4.4 MMOL/L — SIGNIFICANT CHANGE UP (ref 3.5–5.3)
POTASSIUM SERPL-SCNC: 4.4 MMOL/L — SIGNIFICANT CHANGE UP (ref 3.5–5.3)
RBC # BLD: 3.34 M/UL — LOW (ref 3.8–5.2)
RBC # FLD: 14.9 % — HIGH (ref 10.3–14.5)
SODIUM SERPL-SCNC: 137 MMOL/L — SIGNIFICANT CHANGE UP (ref 135–145)
URATE SERPL-MCNC: 9.7 MG/DL — HIGH (ref 2.5–7)
WBC # BLD: 8.8 K/UL — SIGNIFICANT CHANGE UP (ref 3.8–10.5)
WBC # FLD AUTO: 8.8 K/UL — SIGNIFICANT CHANGE UP (ref 3.8–10.5)

## 2017-02-14 PROCEDURE — 99233 SBSQ HOSP IP/OBS HIGH 50: CPT

## 2017-02-14 PROCEDURE — 99233 SBSQ HOSP IP/OBS HIGH 50: CPT | Mod: GC

## 2017-02-14 RX ADMIN — Medication 3: at 12:06

## 2017-02-14 RX ADMIN — Medication 1 GRAM(S): at 15:56

## 2017-02-14 RX ADMIN — Medication 1 CAPSULE(S): at 09:02

## 2017-02-14 RX ADMIN — Medication 650 MILLIGRAM(S): at 18:04

## 2017-02-14 RX ADMIN — CALCITRIOL 0.25 MICROGRAM(S): 0.5 CAPSULE ORAL at 11:55

## 2017-02-14 RX ADMIN — HEPARIN SODIUM 5000 UNIT(S): 5000 INJECTION INTRAVENOUS; SUBCUTANEOUS at 05:24

## 2017-02-14 RX ADMIN — LIDOCAINE 1 PATCH: 4 CREAM TOPICAL at 11:56

## 2017-02-14 RX ADMIN — Medication 3 MILLILITER(S): at 19:18

## 2017-02-14 RX ADMIN — Medication 3: at 17:52

## 2017-02-14 RX ADMIN — Medication 2: at 09:01

## 2017-02-14 RX ADMIN — Medication 1 GRAM(S): at 05:23

## 2017-02-14 RX ADMIN — PANTOPRAZOLE SODIUM 40 MILLIGRAM(S): 20 TABLET, DELAYED RELEASE ORAL at 05:26

## 2017-02-14 RX ADMIN — SIMVASTATIN 20 MILLIGRAM(S): 20 TABLET, FILM COATED ORAL at 22:54

## 2017-02-14 RX ADMIN — Medication 1 PATCH: at 05:26

## 2017-02-14 RX ADMIN — Medication 325 MILLIGRAM(S): at 18:03

## 2017-02-14 RX ADMIN — Medication 50 MILLIGRAM(S): at 22:54

## 2017-02-14 RX ADMIN — Medication 25 MILLIGRAM(S): at 05:26

## 2017-02-14 RX ADMIN — Medication 81 MILLIGRAM(S): at 11:56

## 2017-02-14 RX ADMIN — Medication 1 CAPSULE(S): at 17:57

## 2017-02-14 RX ADMIN — Medication 25 MILLIGRAM(S): at 18:03

## 2017-02-14 RX ADMIN — Medication 1 GRAM(S): at 22:54

## 2017-02-14 RX ADMIN — Medication 325 MILLIGRAM(S): at 11:56

## 2017-02-14 RX ADMIN — LIDOCAINE 1 PATCH: 4 CREAM TOPICAL at 05:33

## 2017-02-14 RX ADMIN — Medication 3 MILLILITER(S): at 13:34

## 2017-02-14 RX ADMIN — HEPARIN SODIUM 5000 UNIT(S): 5000 INJECTION INTRAVENOUS; SUBCUTANEOUS at 17:57

## 2017-02-14 RX ADMIN — Medication 1 CAPSULE(S): at 12:38

## 2017-02-14 RX ADMIN — Medication 3 MILLILITER(S): at 07:44

## 2017-02-14 RX ADMIN — Medication 1 PATCH: at 11:56

## 2017-02-14 RX ADMIN — Medication 325 MILLIGRAM(S): at 09:03

## 2017-02-14 NOTE — PHYSICAL THERAPY INITIAL EVALUATION ADULT - PERTINENT HX OF CURRENT PROBLEM, REHAB EVAL
89 year old old female with a PMH of COPD, CRF, HTN, Chronic Pancreatitis, Gallstones, PVD, recent ER visit for chest pain in January 2017, presents to the ER, LUIS ALFREDO from [facility], for chest heaviness that woke her up in the middle of the night.

## 2017-02-15 LAB
ANION GAP SERPL CALC-SCNC: 14 MMOL/L — SIGNIFICANT CHANGE UP (ref 5–17)
BUN SERPL-MCNC: 110 MG/DL — HIGH (ref 7–23)
CALCIUM SERPL-MCNC: 8.9 MG/DL — SIGNIFICANT CHANGE UP (ref 8.5–10.1)
CHLORIDE SERPL-SCNC: 97 MMOL/L — SIGNIFICANT CHANGE UP (ref 96–108)
CO2 SERPL-SCNC: 23 MMOL/L — SIGNIFICANT CHANGE UP (ref 22–31)
CREAT SERPL-MCNC: 4.9 MG/DL — HIGH (ref 0.5–1.3)
CULTURE RESULTS: SIGNIFICANT CHANGE UP
CULTURE RESULTS: SIGNIFICANT CHANGE UP
GLUCOSE SERPL-MCNC: 237 MG/DL — HIGH (ref 70–99)
HCT VFR BLD CALC: 32.9 % — LOW (ref 34.5–45)
HGB BLD-MCNC: 10.3 G/DL — LOW (ref 11.5–15.5)
MCHC RBC-ENTMCNC: 29 PG — SIGNIFICANT CHANGE UP (ref 27–34)
MCHC RBC-ENTMCNC: 31.2 GM/DL — LOW (ref 32–36)
MCV RBC AUTO: 92.9 FL — SIGNIFICANT CHANGE UP (ref 80–100)
PLATELET # BLD AUTO: 143 K/UL — LOW (ref 150–400)
POTASSIUM SERPL-MCNC: 4.4 MMOL/L — SIGNIFICANT CHANGE UP (ref 3.5–5.3)
POTASSIUM SERPL-SCNC: 4.4 MMOL/L — SIGNIFICANT CHANGE UP (ref 3.5–5.3)
RBC # BLD: 3.55 M/UL — LOW (ref 3.8–5.2)
RBC # FLD: 14.9 % — HIGH (ref 10.3–14.5)
SODIUM SERPL-SCNC: 134 MMOL/L — LOW (ref 135–145)
SPECIMEN SOURCE: SIGNIFICANT CHANGE UP
SPECIMEN SOURCE: SIGNIFICANT CHANGE UP
WBC # BLD: 8.3 K/UL — SIGNIFICANT CHANGE UP (ref 3.8–10.5)
WBC # FLD AUTO: 8.3 K/UL — SIGNIFICANT CHANGE UP (ref 3.8–10.5)

## 2017-02-15 PROCEDURE — 99233 SBSQ HOSP IP/OBS HIGH 50: CPT

## 2017-02-15 PROCEDURE — 93010 ELECTROCARDIOGRAM REPORT: CPT

## 2017-02-15 PROCEDURE — 99233 SBSQ HOSP IP/OBS HIGH 50: CPT | Mod: GC

## 2017-02-15 RX ORDER — TAMSULOSIN HYDROCHLORIDE 0.4 MG/1
0.4 CAPSULE ORAL AT BEDTIME
Qty: 0 | Refills: 0 | Status: DISCONTINUED | OUTPATIENT
Start: 2017-02-15 | End: 2017-02-17

## 2017-02-15 RX ADMIN — Medication 81 MILLIGRAM(S): at 12:41

## 2017-02-15 RX ADMIN — SIMVASTATIN 20 MILLIGRAM(S): 20 TABLET, FILM COATED ORAL at 21:37

## 2017-02-15 RX ADMIN — CALCITRIOL 0.25 MICROGRAM(S): 0.5 CAPSULE ORAL at 12:40

## 2017-02-15 RX ADMIN — LIDOCAINE 1 PATCH: 4 CREAM TOPICAL at 13:25

## 2017-02-15 RX ADMIN — Medication 1 PATCH: at 05:56

## 2017-02-15 RX ADMIN — Medication 325 MILLIGRAM(S): at 18:15

## 2017-02-15 RX ADMIN — Medication 3 MILLILITER(S): at 20:38

## 2017-02-15 RX ADMIN — Medication 1 PATCH: at 12:41

## 2017-02-15 RX ADMIN — Medication 4: at 18:15

## 2017-02-15 RX ADMIN — TAMSULOSIN HYDROCHLORIDE 0.4 MILLIGRAM(S): 0.4 CAPSULE ORAL at 21:37

## 2017-02-15 RX ADMIN — Medication 1 GRAM(S): at 21:37

## 2017-02-15 RX ADMIN — PANTOPRAZOLE SODIUM 40 MILLIGRAM(S): 20 TABLET, DELAYED RELEASE ORAL at 06:06

## 2017-02-15 RX ADMIN — Medication 25 MILLIGRAM(S): at 18:15

## 2017-02-15 RX ADMIN — Medication 1 CAPSULE(S): at 08:38

## 2017-02-15 RX ADMIN — Medication 3 MILLILITER(S): at 13:58

## 2017-02-15 RX ADMIN — Medication 25 MILLIGRAM(S): at 06:00

## 2017-02-15 RX ADMIN — Medication 4: at 12:03

## 2017-02-15 RX ADMIN — Medication 1 CAPSULE(S): at 18:15

## 2017-02-15 RX ADMIN — Medication 1 CAPSULE(S): at 12:41

## 2017-02-15 RX ADMIN — HEPARIN SODIUM 5000 UNIT(S): 5000 INJECTION INTRAVENOUS; SUBCUTANEOUS at 05:59

## 2017-02-15 RX ADMIN — Medication 1 GRAM(S): at 13:26

## 2017-02-15 RX ADMIN — Medication 3 MILLILITER(S): at 07:45

## 2017-02-15 RX ADMIN — Medication 325 MILLIGRAM(S): at 08:39

## 2017-02-15 RX ADMIN — Medication 1 GRAM(S): at 06:00

## 2017-02-15 RX ADMIN — Medication 50 MILLIGRAM(S): at 21:37

## 2017-02-15 RX ADMIN — HEPARIN SODIUM 5000 UNIT(S): 5000 INJECTION INTRAVENOUS; SUBCUTANEOUS at 18:24

## 2017-02-15 RX ADMIN — Medication 2: at 08:37

## 2017-02-15 RX ADMIN — Medication 325 MILLIGRAM(S): at 12:41

## 2017-02-15 RX ADMIN — Medication 3 MILLILITER(S): at 00:25

## 2017-02-15 RX ADMIN — LIDOCAINE 1 PATCH: 4 CREAM TOPICAL at 05:56

## 2017-02-16 ENCOUNTER — TRANSCRIPTION ENCOUNTER (OUTPATIENT)
Age: 82
End: 2017-02-16

## 2017-02-16 LAB
ANION GAP SERPL CALC-SCNC: 14 MMOL/L — SIGNIFICANT CHANGE UP (ref 5–17)
BUN SERPL-MCNC: 118 MG/DL — HIGH (ref 7–23)
CALCIUM SERPL-MCNC: 9.1 MG/DL — SIGNIFICANT CHANGE UP (ref 8.5–10.1)
CHLORIDE SERPL-SCNC: 100 MMOL/L — SIGNIFICANT CHANGE UP (ref 96–108)
CO2 SERPL-SCNC: 21 MMOL/L — LOW (ref 22–31)
CREAT SERPL-MCNC: 4.9 MG/DL — HIGH (ref 0.5–1.3)
GLUCOSE SERPL-MCNC: 237 MG/DL — HIGH (ref 70–99)
HCT VFR BLD CALC: 29 % — LOW (ref 34.5–45)
HGB BLD-MCNC: 9.2 G/DL — LOW (ref 11.5–15.5)
MCHC RBC-ENTMCNC: 28.6 PG — SIGNIFICANT CHANGE UP (ref 27–34)
MCHC RBC-ENTMCNC: 31.6 GM/DL — LOW (ref 32–36)
MCV RBC AUTO: 90.4 FL — SIGNIFICANT CHANGE UP (ref 80–100)
PLATELET # BLD AUTO: 161 K/UL — SIGNIFICANT CHANGE UP (ref 150–400)
POTASSIUM SERPL-MCNC: 4.8 MMOL/L — SIGNIFICANT CHANGE UP (ref 3.5–5.3)
POTASSIUM SERPL-SCNC: 4.8 MMOL/L — SIGNIFICANT CHANGE UP (ref 3.5–5.3)
RBC # BLD: 3.21 M/UL — LOW (ref 3.8–5.2)
RBC # FLD: 14.2 % — SIGNIFICANT CHANGE UP (ref 10.3–14.5)
SODIUM SERPL-SCNC: 135 MMOL/L — SIGNIFICANT CHANGE UP (ref 135–145)
WBC # BLD: 7.2 K/UL — SIGNIFICANT CHANGE UP (ref 3.8–10.5)
WBC # FLD AUTO: 7.2 K/UL — SIGNIFICANT CHANGE UP (ref 3.8–10.5)

## 2017-02-16 PROCEDURE — 99233 SBSQ HOSP IP/OBS HIGH 50: CPT

## 2017-02-16 PROCEDURE — 99233 SBSQ HOSP IP/OBS HIGH 50: CPT | Mod: GC

## 2017-02-16 RX ORDER — METOPROLOL TARTRATE 50 MG
25 TABLET ORAL
Qty: 0 | Refills: 0 | Status: DISCONTINUED | OUTPATIENT
Start: 2017-02-16 | End: 2017-02-17

## 2017-02-16 RX ADMIN — PANTOPRAZOLE SODIUM 40 MILLIGRAM(S): 20 TABLET, DELAYED RELEASE ORAL at 04:52

## 2017-02-16 RX ADMIN — SIMVASTATIN 20 MILLIGRAM(S): 20 TABLET, FILM COATED ORAL at 20:58

## 2017-02-16 RX ADMIN — Medication 50 MILLIGRAM(S): at 20:58

## 2017-02-16 RX ADMIN — Medication 325 MILLIGRAM(S): at 17:40

## 2017-02-16 RX ADMIN — Medication 81 MILLIGRAM(S): at 11:44

## 2017-02-16 RX ADMIN — Medication 1 GRAM(S): at 04:52

## 2017-02-16 RX ADMIN — Medication 2: at 08:44

## 2017-02-16 RX ADMIN — Medication 1 GRAM(S): at 20:58

## 2017-02-16 RX ADMIN — Medication 25 MILLIGRAM(S): at 04:52

## 2017-02-16 RX ADMIN — Medication 25 MILLIGRAM(S): at 17:40

## 2017-02-16 RX ADMIN — Medication 1 CAPSULE(S): at 11:57

## 2017-02-16 RX ADMIN — Medication 1 CAPSULE(S): at 17:40

## 2017-02-16 RX ADMIN — CALCITRIOL 0.25 MICROGRAM(S): 0.5 CAPSULE ORAL at 11:44

## 2017-02-16 RX ADMIN — Medication 325 MILLIGRAM(S): at 11:45

## 2017-02-16 RX ADMIN — HEPARIN SODIUM 5000 UNIT(S): 5000 INJECTION INTRAVENOUS; SUBCUTANEOUS at 17:40

## 2017-02-16 RX ADMIN — TAMSULOSIN HYDROCHLORIDE 0.4 MILLIGRAM(S): 0.4 CAPSULE ORAL at 20:57

## 2017-02-16 RX ADMIN — Medication 4: at 18:21

## 2017-02-16 RX ADMIN — Medication 1 PATCH: at 11:44

## 2017-02-16 RX ADMIN — Medication 6: at 11:57

## 2017-02-16 RX ADMIN — HEPARIN SODIUM 5000 UNIT(S): 5000 INJECTION INTRAVENOUS; SUBCUTANEOUS at 04:51

## 2017-02-16 RX ADMIN — Medication 1 GRAM(S): at 14:32

## 2017-02-16 RX ADMIN — LIDOCAINE 1 PATCH: 4 CREAM TOPICAL at 11:44

## 2017-02-16 RX ADMIN — Medication 3 MILLILITER(S): at 14:03

## 2017-02-16 RX ADMIN — Medication 1 CAPSULE(S): at 08:44

## 2017-02-16 RX ADMIN — Medication 325 MILLIGRAM(S): at 08:45

## 2017-02-16 RX ADMIN — Medication 3 MILLILITER(S): at 20:50

## 2017-02-16 RX ADMIN — Medication 3 MILLILITER(S): at 07:52

## 2017-02-16 NOTE — DISCHARGE NOTE ADULT - ADDITIONAL INSTRUCTIONS
Family interested in comfort measures. Spoke to son Christo, not a candidate for hemodialysis.   Will resume Lasix 20mg on Monday 2/20/17 every other day.  Portable Oxygen rx. Family interested in comfort measures. Spoke to son Christo, not a candidate for hemodialysis.   Will resume Lasix 20mg on Monday 2/20/17 every other day.  Portable Oxygen rx.  Check creatinine next week.

## 2017-02-16 NOTE — DISCHARGE NOTE ADULT - MEDICATION SUMMARY - MEDICATIONS TO CHANGE
I will SWITCH the dose or number of times a day I take the medications listed below when I get home from the hospital:    metoprolol 25 mg oral tablet  --  by mouth once a day

## 2017-02-16 NOTE — DISCHARGE NOTE ADULT - CARE PROVIDER_API CALL
Jose Luis Omalley), Medicine  [address]  [place] [address]  Phone: [phone]  Fax: [phone]    Binta Ruvalcaba), Internal Medicine; Nephrology  [address] [place] [address] [address]  [place] [address]  Phone: [phone]  Fax: [phone]

## 2017-02-16 NOTE — DISCHARGE NOTE ADULT - SECONDARY DIAGNOSIS.
DM (Diabetes Mellitus) COPD (chronic obstructive pulmonary disease) Acute on chronic kidney failure Hypertension, Essential

## 2017-02-16 NOTE — DISCHARGE NOTE ADULT - CARE PLAN
Principal Discharge DX:	Acute on chronic congestive heart failure, unspecified congestive heart failure type  Goal:	resolution  Instructions for follow-up, activity and diet:	use home oxygen, follow up with your Cardiologist.  Secondary Diagnosis:	DM (Diabetes Mellitus)  Instructions for follow-up, activity and diet:	continue home regiment  Secondary Diagnosis:	COPD (chronic obstructive pulmonary disease)  Instructions for follow-up, activity and diet:	continue home regiment  Secondary Diagnosis:	Acute on chronic kidney failure  Instructions for follow-up, activity and diet:	follow up with your Nephrologist. family want conform measures as per son  and refused HD  Secondary Diagnosis:	Hypertension, Essential  Instructions for follow-up, activity and diet:	continue home regiment Principal Discharge DX:	Acute on chronic congestive heart failure, unspecified congestive heart failure type  Goal:	resolution  Instructions for follow-up, activity and diet:	use home oxygen, follow up with your Cardiologist.  Secondary Diagnosis:	DM (Diabetes Mellitus)  Instructions for follow-up, activity and diet:	continue home regiment  Secondary Diagnosis:	COPD (chronic obstructive pulmonary disease)  Instructions for follow-up, activity and diet:	continue home regiment  Secondary Diagnosis:	Acute on chronic kidney failure  Instructions for follow-up, activity and diet:	Family want conformt measures as per son  and refused HD  Will resume Lasix 20mg on Monday 2/20/17 every other day  Dr Omalley nephrologist aware and following as needed, if goes to assisted living [address] Dr. Ruvalcaba PMCARLOS aware of plan  Secondary Diagnosis:	Hypertension, Essential  Instructions for follow-up, activity and diet:	continue home regimen

## 2017-02-16 NOTE — DISCHARGE NOTE ADULT - MEDICATION SUMMARY - MEDICATIONS TO TAKE
I will START or STAY ON the medications listed below when I get home from the hospital:    portable concentrator oxygen 2 liter pulse  -- 1 unit(s) into nose once a day  -- Indication: For COPD (chronic obstructive pulmonary disease)    Actamin 325 mg oral tablet  -- 2 tab(s) by mouth every 6 hours, As needed, Moderate Pain (4 - 6)  -- Indication: For As needed for pain    aspirin 81 mg oral delayed release tablet  -- 1 tab(s) by mouth once a day  -- Indication: For ACS (acute coronary syndrome)    Flomax 0.4 mg oral capsule  -- 1 cap(s) by mouth once a day (at bedtime)  -- Indication: For Acute on chronic kidney failure    traZODone 50 mg oral tablet  -- 1 tab(s) by mouth once a day (at bedtime)  -- Indication: For Insomnia    simvastatin 20 mg oral tablet  -- 1 tab(s) by mouth once a day (at bedtime)  -- Indication: For Heart failure    metoprolol 25 mg oral tablet  --  by mouth 2 times a day  -- Indication: For Heart failure    albuterol-ipratropium CFC free 100 mcg-20 mcg/inh inhalation aerosol  --  inhaled 2 times a day  -- Indication: For COPD (chronic obstructive pulmonary disease)    lidocaine 5% topical film  -- Apply on skin to affected area once a day  -- Indication: For As needed for back pain    Creon 12,000 units-38,000 units-60,000 units oral delayed release capsule  -- 1 cap(s) by mouth 3 times a day  -- Indication: For Chronic kidney disease (CKD), stage 5    Lasix 20 mg oral tablet  -- 1 tab(s) by mouth Monday, Wednesday, and Friday  -- Indication: For Heart failure    Feosol 325 mg (65 mg elemental iron) oral tablet  -- 1 tab(s) by mouth 2 times a day  -- Indication: For Anemia of chronic disease    sucralfate 1 g oral tablet  --  by mouth 3 times a day  -- Indication: For GERD    pantoprazole 40 mg oral delayed release tablet  -- 1 tab(s) by mouth once a day  -- Indication: For GERD    multivitamin  -- 1 tab(s) by mouth once a day  -- Indication: For Prophylactic    calcitriol 0.25 mcg oral capsule  -- 1 cap(s) by mouth once a day  -- Indication: For Chronic kidney disease (CKD), stage 5

## 2017-02-16 NOTE — DISCHARGE NOTE ADULT - HOSPITAL COURSE
89 year old old female with a PMH of COPD, CRF, HTN, Chronic Pancreatitis, Gallstones, PVD, recent ER visit for chest pain in January 2017, presents to the ER, BIBEMS from nursing home, for chest heaviness that woke her up in the middle of the night.  She stays that EMS administered aspirin for her chest pain with good relief.  Patient currently in bed lying supine without complaints of chest pain/pressure/ heaviness or dyspnea.  Patient able to speak in full sentences before having to catch her breath.  Difficult to obtain history due to extremely hard of hearing.  89 year old old female with a PMH of COPD, CRF, HTN, Chronic Pancreatitis, Gallstones, PVD, recent ER visit for chest pain in January 2017, presents to the ER, BIBEMS from nursing home, for chest heaviness a/w prob CHF exacerbation,? need for dialysis and r/o acs and flu. 89 year old old female with a PMH of COPD, DM2,CRF, HTN, Chronic Pancreatitis, Gallstones, PVD, recent ER visit for chest pain in January 2017, presents to the ER, LUIS ALFREDO from nursing home, for chest heaviness that woke her up in the middle of the night.  She stays that EMS administered aspirin for her chest pain with good relief.  Patient currently in bed lying supine without complaints of chest pain/pressure/ heaviness or dyspnea.  Patient able to speak in full sentences before having to catch her breath.  Difficult to obtain history due to extremely hard of hearing.  89 year old old female with a PMH of COPD, CRF, HTN, Chronic Pancreatitis, Gallstones, PVD, recent ER visit for chest pain in January 2017, presents to the ER, LUIS ALFREDO from nursing home, for chest heaviness a/w prob CHF exacerbation,? need for dialysis and r/o acs and flu. CHF exacerbation with improvement of symptoms, patient received Lasix but was discontinued secondary to elevated Cr. Elevated troponin secondary to demand ischemia. DONG on CKD with trending Cr, US kidney and bladder was negative for hydronephrosis or post renal obstruction but positive for shadowing renal calculus solid or cystic mass bilaterally. As per son family do not want aggressive measures. Patient follow by Dr. Omalley(Nephro) and Dr. Sadaf lopes(cardio) and Pt during her hospital stay. Patient will be discharged to rehab with home oxygen and PT. 89 year old old female with a PMH of COPD, DM2,CRF, HTN, Chronic Pancreatitis, Gallstones, PVD, recent ER visit for chest pain in January 2017, presents to the ER, LUIS ALFREDO from nursing home, for chest heaviness that woke her up in the middle of the night.  She stays that EMS administered aspirin for her chest pain with good relief.  Patient currently in bed lying supine without complaints of chest pain/pressure/ heaviness or dyspnea.  Patient able to speak in full sentences before having to catch her breath.  Difficult to obtain history due to extremely hard of hearing.  89 year old old female with a PMH of COPD, CRF, HTN, Chronic Pancreatitis, Gallstones, PVD, recent ER visit for chest pain in January 2017, presents to the ER, LUIS ALFREDO from nursing home, for chest heaviness a/w prob CHF exacerbation,? need for dialysis and r/o acs and flu. CHF exacerbation with improvement of symptoms, patient received Lasix but was discontinued secondary to elevated Cr. Elevated troponin secondary to demand ischemia. DONG on CKD with trending Cr, US kidney and bladder was negative for hydronephrosis or post renal obstruction but positive for shadowing renal calculus solid or cystic mass bilaterally. As per son family do not want aggressive measures. Patient follow by Dr. Omalley(Nephro) and Dr. Sadaf lopes(cardio) and Pt during her hospital stay. Patient family desires comfort measures, will be discharged to rehab with home oxygen and PT.

## 2017-02-16 NOTE — DISCHARGE NOTE ADULT - PLAN OF CARE
resolution use home oxygen, follow up with your Cardiologist. continue home regiment follow up with your Nephrologist. family want conform measures as per son  and refused HD Family want conformt measures as per son  and refused HD  Will resume Lasix 20mg on Monday 2/20/17 every other day  Dr Omalley nephrologist aware and following as needed, if goes to assisted living [address] Dr. Ruvalcaba PMCARLOS aware of plan continue home regimen

## 2017-02-16 NOTE — DISCHARGE NOTE ADULT - PATIENT PORTAL LINK FT
“You can access the Follow[employer] Patient Portal, offered by [facility], by registering with the following website: [email or web address]”

## 2017-02-17 VITALS
RESPIRATION RATE: 18 BRPM | OXYGEN SATURATION: 96 % | TEMPERATURE: 98 F | SYSTOLIC BLOOD PRESSURE: 107 MMHG | DIASTOLIC BLOOD PRESSURE: 62 MMHG | HEART RATE: 86 BPM

## 2017-02-17 PROCEDURE — 71045 X-RAY EXAM CHEST 1 VIEW: CPT

## 2017-02-17 PROCEDURE — 76770 US EXAM ABDO BACK WALL COMP: CPT

## 2017-02-17 PROCEDURE — 97116 GAIT TRAINING THERAPY: CPT

## 2017-02-17 PROCEDURE — 83880 ASSAY OF NATRIURETIC PEPTIDE: CPT

## 2017-02-17 PROCEDURE — 87633 RESP VIRUS 12-25 TARGETS: CPT

## 2017-02-17 PROCEDURE — 96372 THER/PROPH/DIAG INJ SC/IM: CPT | Mod: 59

## 2017-02-17 PROCEDURE — 87040 BLOOD CULTURE FOR BACTERIA: CPT

## 2017-02-17 PROCEDURE — 84550 ASSAY OF BLOOD/URIC ACID: CPT

## 2017-02-17 PROCEDURE — 84484 ASSAY OF TROPONIN QUANT: CPT

## 2017-02-17 PROCEDURE — 85610 PROTHROMBIN TIME: CPT

## 2017-02-17 PROCEDURE — 82550 ASSAY OF CK (CPK): CPT

## 2017-02-17 PROCEDURE — 80053 COMPREHEN METABOLIC PANEL: CPT

## 2017-02-17 PROCEDURE — 84145 PROCALCITONIN (PCT): CPT

## 2017-02-17 PROCEDURE — 96365 THER/PROPH/DIAG IV INF INIT: CPT

## 2017-02-17 PROCEDURE — 83605 ASSAY OF LACTIC ACID: CPT

## 2017-02-17 PROCEDURE — 97161 PT EVAL LOW COMPLEX 20 MIN: CPT

## 2017-02-17 PROCEDURE — 97110 THERAPEUTIC EXERCISES: CPT

## 2017-02-17 PROCEDURE — 99233 SBSQ HOSP IP/OBS HIGH 50: CPT

## 2017-02-17 PROCEDURE — 96376 TX/PRO/DX INJ SAME DRUG ADON: CPT

## 2017-02-17 PROCEDURE — 93005 ELECTROCARDIOGRAM TRACING: CPT

## 2017-02-17 PROCEDURE — 94640 AIRWAY INHALATION TREATMENT: CPT

## 2017-02-17 PROCEDURE — 93306 TTE W/DOPPLER COMPLETE: CPT

## 2017-02-17 PROCEDURE — 85027 COMPLETE CBC AUTOMATED: CPT

## 2017-02-17 PROCEDURE — 94760 N-INVAS EAR/PLS OXIMETRY 1: CPT

## 2017-02-17 PROCEDURE — 87581 M.PNEUMON DNA AMP PROBE: CPT

## 2017-02-17 PROCEDURE — 99239 HOSP IP/OBS DSCHRG MGMT >30: CPT

## 2017-02-17 PROCEDURE — 99221 1ST HOSP IP/OBS SF/LOW 40: CPT

## 2017-02-17 PROCEDURE — 96375 TX/PRO/DX INJ NEW DRUG ADDON: CPT

## 2017-02-17 PROCEDURE — 87486 CHLMYD PNEUM DNA AMP PROBE: CPT

## 2017-02-17 PROCEDURE — 99285 EMERGENCY DEPT VISIT HI MDM: CPT | Mod: 25

## 2017-02-17 PROCEDURE — 82553 CREATINE MB FRACTION: CPT

## 2017-02-17 PROCEDURE — 83036 HEMOGLOBIN GLYCOSYLATED A1C: CPT

## 2017-02-17 PROCEDURE — 87798 DETECT AGENT NOS DNA AMP: CPT

## 2017-02-17 PROCEDURE — 80048 BASIC METABOLIC PNL TOTAL CA: CPT

## 2017-02-17 RX ORDER — FUROSEMIDE 40 MG
1 TABLET ORAL
Qty: 0 | Refills: 0 | COMMUNITY

## 2017-02-17 RX ORDER — ASPIRIN/CALCIUM CARB/MAGNESIUM 324 MG
1 TABLET ORAL
Qty: 0 | Refills: 0 | COMMUNITY
Start: 2017-02-17

## 2017-02-17 RX ORDER — LIDOCAINE 4 G/100G
1 CREAM TOPICAL
Qty: 0 | Refills: 0 | COMMUNITY
Start: 2017-02-17

## 2017-02-17 RX ORDER — ACETAMINOPHEN 500 MG
2 TABLET ORAL
Qty: 0 | Refills: 0 | COMMUNITY
Start: 2017-02-17

## 2017-02-17 RX ORDER — TAMSULOSIN HYDROCHLORIDE 0.4 MG/1
1 CAPSULE ORAL
Qty: 0 | Refills: 0 | COMMUNITY
Start: 2017-02-17

## 2017-02-17 RX ORDER — SIMVASTATIN 20 MG/1
1 TABLET, FILM COATED ORAL
Qty: 0 | Refills: 0 | COMMUNITY
Start: 2017-02-17

## 2017-02-17 RX ADMIN — Medication 325 MILLIGRAM(S): at 08:59

## 2017-02-17 RX ADMIN — Medication 2: at 17:30

## 2017-02-17 RX ADMIN — Medication 3 MILLILITER(S): at 14:17

## 2017-02-17 RX ADMIN — HEPARIN SODIUM 5000 UNIT(S): 5000 INJECTION INTRAVENOUS; SUBCUTANEOUS at 17:30

## 2017-02-17 RX ADMIN — Medication 1 PATCH: at 12:15

## 2017-02-17 RX ADMIN — Medication 3 MILLILITER(S): at 02:06

## 2017-02-17 RX ADMIN — Medication 1 CAPSULE(S): at 12:11

## 2017-02-17 RX ADMIN — CALCITRIOL 0.25 MICROGRAM(S): 0.5 CAPSULE ORAL at 12:11

## 2017-02-17 RX ADMIN — Medication 325 MILLIGRAM(S): at 12:11

## 2017-02-17 RX ADMIN — PANTOPRAZOLE SODIUM 40 MILLIGRAM(S): 20 TABLET, DELAYED RELEASE ORAL at 05:15

## 2017-02-17 RX ADMIN — Medication 2: at 12:11

## 2017-02-17 RX ADMIN — Medication 1 GRAM(S): at 05:09

## 2017-02-17 RX ADMIN — Medication 3 MILLILITER(S): at 08:09

## 2017-02-17 RX ADMIN — HEPARIN SODIUM 5000 UNIT(S): 5000 INJECTION INTRAVENOUS; SUBCUTANEOUS at 05:09

## 2017-02-17 RX ADMIN — LIDOCAINE 1 PATCH: 4 CREAM TOPICAL at 12:10

## 2017-02-17 RX ADMIN — Medication 1 GRAM(S): at 14:33

## 2017-02-17 RX ADMIN — Medication 1 CAPSULE(S): at 08:59

## 2017-02-17 RX ADMIN — Medication 25 MILLIGRAM(S): at 05:10

## 2017-02-17 RX ADMIN — Medication 25 MILLIGRAM(S): at 17:30

## 2017-02-17 RX ADMIN — Medication 1 CAPSULE(S): at 17:30

## 2017-02-17 RX ADMIN — Medication 325 MILLIGRAM(S): at 17:30

## 2017-02-17 RX ADMIN — Medication 81 MILLIGRAM(S): at 12:11

## 2017-02-17 RX ADMIN — Medication 2: at 08:25

## 2017-02-21 DIAGNOSIS — I50.33 ACUTE ON CHRONIC DIASTOLIC (CONGESTIVE) HEART FAILURE: ICD-10-CM

## 2017-02-21 DIAGNOSIS — J44.9 CHRONIC OBSTRUCTIVE PULMONARY DISEASE, UNSPECIFIED: ICD-10-CM

## 2017-02-21 DIAGNOSIS — I24.8 OTHER FORMS OF ACUTE ISCHEMIC HEART DISEASE: ICD-10-CM

## 2017-02-21 DIAGNOSIS — N18.5 CHRONIC KIDNEY DISEASE, STAGE 5: ICD-10-CM

## 2017-02-21 DIAGNOSIS — I13.2 HYPERTENSIVE HEART AND CHRONIC KIDNEY DISEASE WITH HEART FAILURE AND WITH STAGE 5 CHRONIC KIDNEY DISEASE, OR END STAGE RENAL DISEASE: ICD-10-CM

## 2017-02-21 DIAGNOSIS — E11.8 TYPE 2 DIABETES MELLITUS WITH UNSPECIFIED COMPLICATIONS: ICD-10-CM

## 2017-02-21 DIAGNOSIS — R32 UNSPECIFIED URINARY INCONTINENCE: ICD-10-CM

## 2017-02-21 DIAGNOSIS — Z66 DO NOT RESUSCITATE: ICD-10-CM

## 2017-02-21 DIAGNOSIS — R79.89 OTHER SPECIFIED ABNORMAL FINDINGS OF BLOOD CHEMISTRY: ICD-10-CM

## 2017-02-21 DIAGNOSIS — N17.9 ACUTE KIDNEY FAILURE, UNSPECIFIED: ICD-10-CM

## 2017-02-21 DIAGNOSIS — K86.1 OTHER CHRONIC PANCREATITIS: ICD-10-CM

## 2017-02-21 DIAGNOSIS — I73.9 PERIPHERAL VASCULAR DISEASE, UNSPECIFIED: ICD-10-CM

## 2017-10-04 NOTE — PHYSICAL THERAPY INITIAL EVALUATION ADULT - BED MOBILITY TRAINING, PT EVAL
Admission medication history interview status for the 10/4/2017  admission is complete. See EPIC admission navigator for prior to admission medications     Medication history source reliability:Good    Medication history interview source(s):Patient    Medication history resources (including written lists, pill bottles, clinic record):Phone    Primary pharmacy.[facility]    Additional medication history information not noted on PTA med list :None    Time spent in this activity: 45 minutes    Prior to Admission medications    Medication Sig Last Dose Taking? Auth Provider   Naproxen Sodium (ALEVE PO) Take 440 mg by mouth nightly as needed for moderate pain 10/3/2017 at 2200 Yes Reported, Patient   OXYCODONE HCL PO Take 5 mg by mouth daily as needed 9/30/2017 at prn Yes Reported, Patient   Calcium-Magnesium-Vitamin D (CALCIUM MAGNESIUM PO) Take 1 tablet by mouth daily as needed more than a week at prn Yes Reported, Patient   Omega-3 Fatty Acids (FISH OIL PO) Take 1 tablet by mouth daily as needed more than a week at prn Yes Reported, Patient   LevETIRAcetam (KEPPRA XR PO) Take 1,000 mg by mouth At Bedtime (2 x 500 mg tablet = 1000 mg dose) 10/3/2017 at pm Yes Reported, Patient   ALPRAZolam (XANAX PO) Take 0.75 mg by mouth At Bedtime (1.5 tablet x 0.5 mg = 0.75 mg dose) 10/3/2017 at pm Yes Reported, Patient   atorvastatin (LIPITOR) 20 MG tablet Take 1 tablet (20 mg) by mouth daily 1 Tablet Daily 10/3/2017 at am Yes Karen Larios PA-C          Patient will transfer supine-->sit with CG in 5 sessions.

## 2018-05-25 NOTE — ED ADULT NURSE REASSESSMENT NOTE - CONDITION
PATIENT HAS IV FLUIDS AT TKO TO GO WITH IV UNASYN AND VANCOMYCIN, ON ROOM AIR, ON TELE RUNNING NSR, VOIDING WELL, PASSING FLATUS, INCISION IS C/D/I, IR DRAIN-IRRIGATED WITH 10CC NS Q8H, AMBULATES INDEPENDENTLY, RIGHT ARM PRECAUTIONS FOR MIDLINE.  POSSIBLE D unchanged

## 2024-02-29 NOTE — PATIENT PROFILE ADULT. - SLEEP, RESTED UPON AWAKENING, PROFILE
Chief Complaint  Establish Care and Hypertension    SUBJECTIVE  Sigrid Morgan presents to [facility]    History of Present Illness  Patient is a 54-year-old female who presents today to establish care.  Previous PCP was in [place].  Patient moved to the area to be with her mother.  She is due annual physical exam, to be done in office today.  She needs chronic condition management of hypertension, chronic pain.    Chronic pain-patient reports she has spinal stenosis.  Patient was seen in pain clinic in [place].  Patient was taking morphine and oxycodone.  Patient is open to referral to pain management at this time.  Patient is also taking diclofenac.  Patient is asking for refill on diclofenac at this time.    Hypertension-patient was previously on hydrochlorothiazide 25 mg.  Patient reports this controls her blood pressure well.  Patient has been out of this for several months since she moved to the area.  She is agreeable to restart medication.  Denies any chest pain, shortness of breath.  Patient reports she did have a syncopal episode back in September but everything was ruled out normal.    Patient is due mammogram orders to be placed today.    Patient is due colon cancer screening.  Patient has never had colonoscopy.  She is open to referral today.  Reports she has occasional bloating and generalized abdominal tenderness.  She has a history of a hernia.    Patient had hysterectomy in 2016 due to fibroids.  Patient reports fibroids were benign after pathology.    Patient declines vaccines today. Patient understands the risks of not having.     Patient is due labs. Orders placed at today's visit. Discussed with patient that these are fasting labs.     No other concerns or complaints at this time.  Patient denies any diarrhea, constipation, blood in stool, urinary urgency, frequency, or dysuria, chest pain, shortness of breath or syncope.           Past Medical History:  "  Diagnosis Date    Chronic pain     Eczema     Hypertension       No family history on file.   Past Surgical History:   Procedure Laterality Date     SECTION      HYSTERECTOMY          Current Outpatient Medications:     diclofenac (VOLTAREN) 50 MG EC tablet, Take 1 tablet by mouth 3 (Three) Times a Day., Disp: 180 tablet, Rfl: 0    hydroCHLOROthiazide 25 MG tablet, Take 1 tablet by mouth Daily., Disp: 30 tablet, Rfl: 0    triamcinolone (KENALOG) 0.5 % cream, Apply 1 Application topically to the appropriate area as directed 3 (Three) Times a Day., Disp: , Rfl:     Morphine (MSIR) 15 MG tablet, Take 1 tablet by mouth Every 4 (Four) Hours As Needed for Severe Pain. (Patient not taking: Reported on 2024), Disp: , Rfl:     oxyCODONE-acetaminophen (LYNOX) 5-300 MG per tablet, Take 1 tablet by mouth Every 6 (Six) Hours As Needed. (Patient not taking: Reported on 2024), Disp: , Rfl:     OBJECTIVE  Vital Signs:   /96 (BP Location: Left arm, Patient Position: Sitting, Cuff Size: Large Adult)   Pulse (!) 43   Ht 163.8 cm (64.5\")   Wt 88.4 kg (194 lb 12.8 oz)   SpO2 100%   BMI 32.92 kg/m²    Estimated body mass index is 32.92 kg/m² as calculated from the following:    Height as of this encounter: 163.8 cm (64.5\").    Weight as of this encounter: 88.4 kg (194 lb 12.8 oz).     Wt Readings from Last 3 Encounters:   24 88.4 kg (194 lb 12.8 oz)   24 86.9 kg (191 lb 9.3 oz)     BP Readings from Last 3 Encounters:   24 140/96   24 141/75       Physical Exam  Vitals reviewed.   Constitutional:       General: She is awake. She is not in acute distress.     Appearance: She is well-developed and well-groomed. She is obese. She is not ill-appearing.   HENT:      Head: Normocephalic and atraumatic.      Right Ear: Tympanic membrane, ear canal and external ear normal.      Left Ear: Tympanic membrane, ear canal and external ear normal.      Nose: Nose normal.      Mouth/Throat:      " Mouth: Mucous membranes are moist.      Pharynx: Oropharynx is clear. No oropharyngeal exudate or posterior oropharyngeal erythema.   Eyes:      Extraocular Movements: Extraocular movements intact.      Conjunctiva/sclera: Conjunctivae normal.      Pupils: Pupils are equal, round, and reactive to light.   Neck:      Thyroid: No thyroid mass, thyromegaly or thyroid tenderness.   Cardiovascular:      Rate and Rhythm: Normal rate and regular rhythm.      Heart sounds: Normal heart sounds.   Pulmonary:      Effort: Pulmonary effort is normal.      Breath sounds: Normal breath sounds.   Abdominal:      General: Abdomen is flat. Bowel sounds are normal. There is no distension.      Palpations: Abdomen is soft.      Tenderness: There is no abdominal tenderness.   Musculoskeletal:      Cervical back: Normal range of motion and neck supple. No rigidity or tenderness.   Lymphadenopathy:      Cervical: No cervical adenopathy.   Skin:     General: Skin is warm and dry.   Neurological:      Mental Status: She is alert and oriented to person, place, and time.   Psychiatric:         Mood and Affect: Mood normal.         Behavior: Behavior normal. Behavior is cooperative.         Thought Content: Thought content normal.         Judgment: Judgment normal.          Result Review        XR Hip With or Without Pelvis 2 - 3 View Left    Result Date: 2/5/2024    Left hip series demonstrating mild to moderate degenerative change consistent with osteoarthritis.      DEN RUIZ MD       Electronically Signed and Approved By: DEN RUIZ MD on 2/05/2024 at 18:27                 The above data has been reviewed by DENNIS Quinonez 02/29/2024 07:40 EST.          Patient Care Team:  Camilla Mena APRN as PCP - General (Nurse Practitioner)    BMI is >= 30 and <35. (Class 1 Obesity). The following options were offered after discussion;: exercise counseling/recommendations and nutrition counseling/recommendations       ASSESSMENT &  PLAN    Diagnoses and all orders for this visit:    1. Annual physical exam (Primary)  Comments:  preventative counseling  healthy diet  daily exercise  get adequate sleep  Orders:  -     Comprehensive Metabolic Panel; Future  -     CBC & Differential; Future  -     Lipid Panel; Future  -     TSH+Free T4; Future  -     Hepatitis C antibody; Future  -     Mammo Screening Digital Tomosynthesis Bilateral With CAD; Future    2. Encounter to establish care  Comments:  med hx and medications reviewed    3. Screening for lipid disorders  -     Lipid Panel; Future    4. Screening for thyroid disorder  -     TSH+Free T4; Future    5. Need for hepatitis C screening test  -     Hepatitis C antibody; Future    6. Encounter for screening mammogram for malignant neoplasm of breast  -     Mammo Screening Digital Tomosynthesis Bilateral With CAD; Future    7. Screening for colon cancer  -     Ambulatory Referral For Screening Colonoscopy    8. Other chronic pain  Comments:  ref to pain management placed  Orders:  -     Ambulatory Referral to Pain Management  -     diclofenac (VOLTAREN) 50 MG EC tablet; Take 1 tablet by mouth 3 (Three) Times a Day.  Dispense: 180 tablet; Refill: 0    9. Primary hypertension  Comments:  uncontrolled, restart hctz 25 mg, check bp at home, follow up in 1 month  Orders:  -     Discontinue: hydroCHLOROthiazide 12.5 MG tablet; Take 1 tablet by mouth Daily.  Dispense: 30 tablet; Refill: 0  -     hydroCHLOROthiazide 25 MG tablet; Take 1 tablet by mouth Daily.  Dispense: 30 tablet; Refill: 0         Tobacco Use: Not on file       Follow Up     Return in about 1 month (around 3/29/2024) for hypertension.      Patient was given instructions and counseling regarding her condition or for health maintenance advice. Please see specific information pulled into the AVS if appropriate.   I have reviewed information obtained and documented by others and I have confirmed the accuracy of this documented  note.    Camilla Mena, DENNIS         yes

## 2024-10-31 NOTE — H&P ADULT. - PROBLEM SELECTOR PROBLEM 5
Blood pressure is well-controlled at this time, continue to encourage low-sodium diet, continue with lisinopril 10 mg daily.   Chronic obstructive pulmonary disease, unspecified COPD type

## 2025-02-05 NOTE — DISCHARGE NOTE ADULT - RECOGNIZE DANGER SITUATIONS. FOR EXAMPLE, STRESS, DRINKING ALCOHOL, URGES TO SMOKE, SMOKING CUES, CIGARETTE AVAILABILITY
History of asthma, she has been trialed on multiple inhalers in the past.  Recent ER visit due to shortness of breath.  She was evaluated in the past by cardiology, recommendations for pulm referral.  Pulmonology referral placed today.  Will start Trelegy, continue albuterol as needed.   Statement Selected